# Patient Record
Sex: MALE | Race: WHITE | NOT HISPANIC OR LATINO | Employment: FULL TIME | URBAN - METROPOLITAN AREA
[De-identification: names, ages, dates, MRNs, and addresses within clinical notes are randomized per-mention and may not be internally consistent; named-entity substitution may affect disease eponyms.]

---

## 2023-10-18 PROBLEM — I10 HYPERTENSION: Status: ACTIVE | Noted: 2023-10-18

## 2023-10-18 PROBLEM — R20.0 LEG NUMBNESS: Status: ACTIVE | Noted: 2023-10-18

## 2023-10-18 PROBLEM — I51.7 LEFT VENTRICULAR HYPERTROPHY: Status: ACTIVE | Noted: 2023-10-18

## 2023-10-18 PROBLEM — J45.990 ASTHMA, EXERCISE INDUCED (HHS-HCC): Status: ACTIVE | Noted: 2023-10-18

## 2023-10-18 PROBLEM — G47.33 MODERATE OBSTRUCTIVE SLEEP APNEA: Status: ACTIVE | Noted: 2023-10-18

## 2023-10-18 PROBLEM — N40.0 BENIGN PROSTATIC HYPERPLASIA: Status: ACTIVE | Noted: 2023-10-18

## 2023-10-18 PROBLEM — I51.89 DIASTOLIC DYSFUNCTION: Status: ACTIVE | Noted: 2023-10-18

## 2023-10-18 PROBLEM — N52.9 ORGANIC IMPOTENCE: Status: ACTIVE | Noted: 2023-10-18

## 2023-10-18 PROBLEM — M1A.9XX0 CHRONIC GOUT: Status: ACTIVE | Noted: 2023-10-18

## 2023-10-18 PROBLEM — R45.4 IRRITABILITY: Status: ACTIVE | Noted: 2023-10-18

## 2023-10-18 PROBLEM — B35.3 TINEA PEDIS OF BOTH FEET: Status: ACTIVE | Noted: 2023-10-18

## 2023-10-18 PROBLEM — R06.02 SHORTNESS OF BREATH: Status: ACTIVE | Noted: 2023-10-18

## 2023-10-18 RX ORDER — TADALAFIL 20 MG/1
1 TABLET ORAL
COMMUNITY
Start: 2017-01-20 | End: 2023-10-19

## 2023-10-18 RX ORDER — MULTIVITAMIN
TABLET ORAL
COMMUNITY

## 2023-10-18 RX ORDER — VENLAFAXINE HYDROCHLORIDE 37.5 MG/1
CAPSULE, EXTENDED RELEASE ORAL
COMMUNITY
Start: 2017-03-17 | End: 2023-10-19

## 2023-10-18 RX ORDER — AMMONIUM LACTATE 12 G/100G
LOTION TOPICAL
COMMUNITY
Start: 2016-01-20

## 2023-10-18 RX ORDER — HYDROCHLOROTHIAZIDE 25 MG/1
1 TABLET ORAL DAILY
COMMUNITY
Start: 2017-06-26

## 2023-10-18 RX ORDER — LAMOTRIGINE 100 MG/1
2 TABLET ORAL EVERY MORNING
COMMUNITY
Start: 2014-06-30

## 2023-10-18 RX ORDER — ASCORBATE CALCIUM 500 MG
TABLET ORAL
COMMUNITY

## 2023-10-18 RX ORDER — LOSARTAN POTASSIUM 100 MG/1
1 TABLET ORAL DAILY
COMMUNITY
Start: 2018-09-14

## 2023-10-18 RX ORDER — LANOLIN ALCOHOL/MO/W.PET/CERES
CREAM (GRAM) TOPICAL
COMMUNITY

## 2023-10-18 RX ORDER — METHYLPHENIDATE HYDROCHLORIDE EXTENDED RELEASE 20 MG/1
1 TABLET ORAL DAILY
COMMUNITY
Start: 2016-11-15

## 2023-10-18 RX ORDER — ALLOPURINOL 300 MG/1
1 TABLET ORAL DAILY
COMMUNITY
Start: 2013-05-24

## 2023-10-18 RX ORDER — TURMERIC 400 MG
CAPSULE ORAL
COMMUNITY

## 2023-10-19 ENCOUNTER — OFFICE VISIT (OUTPATIENT)
Dept: CARDIOLOGY | Facility: CLINIC | Age: 63
End: 2023-10-19
Payer: COMMERCIAL

## 2023-10-19 VITALS
SYSTOLIC BLOOD PRESSURE: 132 MMHG | BODY MASS INDEX: 29.71 KG/M2 | HEIGHT: 76 IN | OXYGEN SATURATION: 94 % | WEIGHT: 244 LBS | DIASTOLIC BLOOD PRESSURE: 72 MMHG | HEART RATE: 71 BPM

## 2023-10-19 DIAGNOSIS — I51.89 DIASTOLIC DYSFUNCTION: ICD-10-CM

## 2023-10-19 DIAGNOSIS — I10 PRIMARY HYPERTENSION: ICD-10-CM

## 2023-10-19 DIAGNOSIS — E78.5 HYPERLIPIDEMIA, UNSPECIFIED HYPERLIPIDEMIA TYPE: Primary | ICD-10-CM

## 2023-10-19 PROCEDURE — 3078F DIAST BP <80 MM HG: CPT | Performed by: INTERNAL MEDICINE

## 2023-10-19 PROCEDURE — 99213 OFFICE O/P EST LOW 20 MIN: CPT | Performed by: INTERNAL MEDICINE

## 2023-10-19 PROCEDURE — 1036F TOBACCO NON-USER: CPT | Performed by: INTERNAL MEDICINE

## 2023-10-19 PROCEDURE — 3075F SYST BP GE 130 - 139MM HG: CPT | Performed by: INTERNAL MEDICINE

## 2023-10-19 RX ORDER — LISINOPRIL AND HYDROCHLOROTHIAZIDE 12.5; 2 MG/1; MG/1
1 TABLET ORAL
COMMUNITY

## 2023-10-19 RX ORDER — VENLAFAXINE HYDROCHLORIDE 75 MG/1
75 CAPSULE, EXTENDED RELEASE ORAL DAILY
COMMUNITY
Start: 2023-09-02

## 2023-10-19 RX ORDER — ATORVASTATIN CALCIUM 20 MG/1
20 TABLET, FILM COATED ORAL DAILY
Qty: 30 TABLET | Refills: 11 | Status: SHIPPED | OUTPATIENT
Start: 2023-10-19 | End: 2023-12-07 | Stop reason: SDUPTHER

## 2023-10-19 RX ORDER — TRAZODONE HYDROCHLORIDE 50 MG/1
50 TABLET ORAL NIGHTLY PRN
COMMUNITY
Start: 2023-09-07

## 2023-10-19 RX ORDER — CYANOCOBALAMIN (VITAMIN B-12) 500 MCG
TABLET ORAL
COMMUNITY

## 2023-10-19 ASSESSMENT — PAIN SCALES - GENERAL: PAINLEVEL: 0-NO PAIN

## 2023-10-19 ASSESSMENT — ENCOUNTER SYMPTOMS
DEPRESSION: 0
OCCASIONAL FEELINGS OF UNSTEADINESS: 0
LOSS OF SENSATION IN FEET: 0

## 2023-10-19 NOTE — PROGRESS NOTES
Chief Complaint:   No chief complaint on file.     History Of Present Illness:    Richard Teixeira is a 63 y.o. male with a history of hypertension, mild concentric left ventricular hypertrophy, diastolic dysfunction, and obstructive sleep apnea here for discussion about his lipid panel.      Comes today because his lipid panel was drawn at work and demonstrated an elevated numbers.    We talked about his lipid panels over the past several years.  We talked about the fact that his numbers never looked better than when he was training for his trip to [facility].     Treadmill stress test 8/25/2021: No evidence of ischemia or scar. Regan treadmill score of 5     Echocardiogram 11/22/16 demonstrating normal LV size with mild concentric LVH, EF 60-65%. Grade 1 diastolic dysfunction. Normal RV size and function.     Treadmill nuclear stress test 11/2/16. Patient exercised 9 minutes and 55 seconds on standard Stephan protocol achieving 85% of the age. Maximum heart rate. No ECG evidence of ischemia.     Past Medical History:  He has no past medical history on file.    Past Surgical History:  He has a past surgical history that includes Appendectomy (11/15/2016) and Back surgery (11/15/2016).      Social History:  He reports that he has never smoked. He has never used smokeless tobacco. He reports that he does not drink alcohol and does not use drugs.    Family History:  Family History   Problem Relation Name Age of Onset    Uterine cancer Mother      Other (Coronary Heart Disease) Father          Allergies:  Patient has no known allergies.    Outpatient Medications:  Current Outpatient Medications   Medication Instructions    allopurinol (Zyloprim) 300 mg tablet 1 tablet, oral, Daily    ammonium lactate (Lac-Hydrin) 12 % lotion Ammonium Lactate 12 % External Lotion   Quantity: 400  Refills: 0        Start : 20-Jan-2016   Active    CALCIUM CITRATE-VITAMIN D3 ORAL Calcium + D TABS   Refills: 0       Active    cholecalciferol  "(Vitamin D-3) 10 MCG (400 UNIT) tablet     hydroCHLOROthiazide (HYDRODiuril) 25 mg tablet 1 tablet, oral, Daily    lamoTRIgine (LaMICtal) 100 mg tablet 2 tablets, oral, Every morning,  and 1 tablet by mouth at bedtime<BR>    lisinopriL-hydrochlorothiazide 20-12.5 mg tablet 1 tablet, oral, Daily RT    losartan (Cozaar) 100 mg tablet 1 tablet, oral, Daily    magnesium oxide (Mag-Ox) 400 mg (241.3 mg magnesium) tablet Magnesium Oxide 400 MG Oral Tablet   Refills: 0       Active    methylphenidate ER (Metadate ER) 20 mg daily tablet 1 tablet, oral, Daily, As directed     multivitamin (Daily Multi-Vitamin) tablet Multi Vitamin Daily TABS   Refills: 0       Active    traZODone (DESYREL) 50 mg, oral, Nightly PRN    turmeric 400 mg capsule Turmeric 400 MG Oral Capsule   Refills: 0       Active    venlafaxine XR (EFFEXOR-XR) 75 mg, oral, Daily    vitamin E acid succinate (vitamin E succinate) 67 mg (100 unit) tablet Vitamin E 100 UNIT Oral Tablet   Refills: 0       Active       Last Recorded Vitals:  Visit Vitals  /72 (BP Location: Left arm, Patient Position: Sitting)   Pulse 71   Ht 1.93 m (6' 4\")   Wt 111 kg (244 lb)   SpO2 94%   BMI 29.70 kg/m²   Smoking Status Never   BSA 2.44 m²        Physical Exam:  Constitutional: alert and in no acute distress.   Psychiatric: Oriented to person, place and time. No obvious agitation.      Last Labs:  CBC -  No results found for: \"WBC\", \"HGB\", \"HCT\", \"MCV\", \"PLT\"    CMP -  No results found for: \"CALCIUM\", \"PHOS\", \"PROT\", \"ALBUMIN\", \"AST\", \"ALT\", \"ALKPHOS\", \"BILITOT\"    LIPID PANEL -   Lipid panel 2023: Total 213, HDL 52, , TG 85  Lipid Panel 8/16/21: Total 168, HDL 57, , TG 82  Lipid panel 2020: Total 216, HDL 52, ,     RENAL FUNCTION PANEL -   No results found for: \"GLUCOSE\", \"NA\", \"K\", \"CL\", \"CO2\", \"ANIONGAP\", \"BUN\", \"CREATININE\", \"GFRMALE\", \"CALCIUM\", \"PHOS\", \"ALBUMIN\"     Lab Results   Component Value Date    HGBA1C 5.8 (H) 08/16/2021 "           Assessment/Plan   1) dyslipidemia: Went over his lipid panels over the past several years.  Other than 2021 when he was training to go to [employer] his lipid panel has essentially not changed for several years.  We went over his 10-year atherosclerotic cardiovascular disease risk calculator which yielded a 10-year risk greater than 10%.  We discussed the recommendation to start statin therapy.  We have decided to start atorvastatin 20 mg daily.  He is encouraged to continue diet and exercise.  He will get his routine blood work next year prior to his appointment in August.    2) follow-up: In August or sooner if needed      Lewis Andrade MD

## 2023-12-07 DIAGNOSIS — E78.5 HYPERLIPIDEMIA, UNSPECIFIED HYPERLIPIDEMIA TYPE: ICD-10-CM

## 2023-12-08 RX ORDER — ATORVASTATIN CALCIUM 20 MG/1
20 TABLET, FILM COATED ORAL DAILY
Qty: 30 TABLET | Refills: 11 | Status: SHIPPED | OUTPATIENT
Start: 2023-12-08 | End: 2023-12-11 | Stop reason: SDUPTHER

## 2023-12-08 RX ORDER — AMMONIUM LACTATE 12 G/100G
LOTION TOPICAL
OUTPATIENT
Start: 2023-12-08

## 2023-12-11 DIAGNOSIS — E78.5 HYPERLIPIDEMIA, UNSPECIFIED HYPERLIPIDEMIA TYPE: ICD-10-CM

## 2023-12-11 RX ORDER — ATORVASTATIN CALCIUM 20 MG/1
20 TABLET, FILM COATED ORAL DAILY
Qty: 90 TABLET | Refills: 3 | Status: SHIPPED | OUTPATIENT
Start: 2023-12-11 | End: 2024-12-10

## 2024-06-19 DIAGNOSIS — I11.0 BENIGN HYPERTENSIVE HEART DISEASE WITH HEART FAILURE (MULTI): ICD-10-CM

## 2024-06-19 RX ORDER — LOSARTAN POTASSIUM 100 MG/1
100 TABLET ORAL DAILY
Qty: 90 TABLET | Refills: 3 | Status: SHIPPED | OUTPATIENT
Start: 2024-06-19

## 2024-08-05 DIAGNOSIS — I10 ESSENTIAL HYPERTENSION: ICD-10-CM

## 2024-08-05 RX ORDER — HYDROCHLOROTHIAZIDE 25 MG/1
25 TABLET ORAL DAILY
Qty: 90 TABLET | Refills: 3 | Status: SHIPPED | OUTPATIENT
Start: 2024-08-05

## 2024-08-15 ENCOUNTER — APPOINTMENT (OUTPATIENT)
Dept: CARDIOLOGY | Facility: CLINIC | Age: 64
End: 2024-08-15
Payer: COMMERCIAL

## 2024-09-05 ENCOUNTER — APPOINTMENT (OUTPATIENT)
Dept: CARDIOLOGY | Facility: CLINIC | Age: 64
End: 2024-09-05
Payer: COMMERCIAL

## 2024-09-05 VITALS
OXYGEN SATURATION: 98 % | HEIGHT: 76 IN | SYSTOLIC BLOOD PRESSURE: 132 MMHG | HEART RATE: 66 BPM | WEIGHT: 242 LBS | BODY MASS INDEX: 29.47 KG/M2 | DIASTOLIC BLOOD PRESSURE: 80 MMHG

## 2024-09-05 DIAGNOSIS — E78.5 DYSLIPIDEMIA: ICD-10-CM

## 2024-09-05 DIAGNOSIS — I10 PRIMARY HYPERTENSION: ICD-10-CM

## 2024-09-05 DIAGNOSIS — I51.7 LEFT VENTRICULAR HYPERTROPHY: Primary | ICD-10-CM

## 2024-09-05 PROCEDURE — 3078F DIAST BP <80 MM HG: CPT | Performed by: NURSE PRACTITIONER

## 2024-09-05 PROCEDURE — 3008F BODY MASS INDEX DOCD: CPT | Performed by: NURSE PRACTITIONER

## 2024-09-05 PROCEDURE — 3075F SYST BP GE 130 - 139MM HG: CPT | Performed by: NURSE PRACTITIONER

## 2024-09-05 PROCEDURE — 99214 OFFICE O/P EST MOD 30 MIN: CPT | Performed by: NURSE PRACTITIONER

## 2024-09-05 PROCEDURE — 93000 ELECTROCARDIOGRAM COMPLETE: CPT | Performed by: NURSE PRACTITIONER

## 2024-09-05 NOTE — PROGRESS NOTES
Name : Polo Teixeira   : 1960   MRN : [id number]   ENC Date : 2024    Primary Cardiologist: Dr. Andrade     CC: No chief complaint on file.     HPI:    Polo Teixeira is a 64 y.o. male hypertension, mild concentric left ventricular hypertrophy, diastolic dysfunction, and obstructive sleep apnea who presents today for annual cardiovascular follow up.     He has done very well since his last visit. Reports no major health issues and has had no hospitalizations. Denies any chest pain, pressure, SOB/, PND, orthopnea, LE edema, palpitations, lightheadedness, dizziness, or syncope at rest or with exertions. He is compliant with his medications and reports no side effects. He has been physically active:    lifts almost everyday, multiple exercise classes (strength/cardio/yoga/cycling).    Now on a Mediterranean diet     for medical mutual.    CV Diagnostics:  Treadmill stress test 2021: No evidence of ischemia or scar. Regan treadmill score of 5     Echocardiogram 16 demonstrating normal LV size with mild concentric LVH, EF 60-65%. Grade 1 diastolic dysfunction. Normal RV size and function.     Treadmill nuclear stress test 16. Patient exercised 9 minutes and 55 seconds on standard Stephan protocol achieving 85% of the age. Maximum heart rate. No ECG evidence of ischemia.    ROS: unless otherwise noted in the history of present illness, all other systems were reviewed and they are negative for complaints     Allergies:  Patient has no known allergies.    Current Outpatient Medications   Medication Instructions    allopurinol (Zyloprim) 300 mg tablet 1 tablet, oral, Daily    ammonium lactate (Lac-Hydrin) 12 % lotion Ammonium Lactate 12 % External Lotion   Quantity: 400  Refills: 0        Start : 2016   Active    atorvastatin (LIPITOR) 20 mg, oral, Daily    CALCIUM CITRATE-VITAMIN D3 ORAL Calcium + D TABS   Refills: 0       Active    hydroCHLOROthiazide (HYDRODIURIL) 25 mg,  "oral, Daily    lamoTRIgine (LaMICtal) 100 mg tablet 2 tablets, oral, Every morning,  and 1 tablet by mouth at bedtime<BR>    losartan (COZAAR) 100 mg, oral, Daily    magnesium oxide (Mag-Ox) 400 mg (241.3 mg magnesium) tablet Magnesium Oxide 400 MG Oral Tablet   Refills: 0       Active    methylphenidate ER (Metadate ER) 20 mg daily tablet 1 tablet, oral, Daily, As directed     multivitamin (Daily Multi-Vitamin) tablet Multi Vitamin Daily TABS   Refills: 0       Active    traZODone (DESYREL) 50 mg, oral, Nightly PRN, Taking 25mg at bedtime nightly    venlafaxine XR (EFFEXOR-XR) 75 mg, oral, Daily    vitamin E acid succinate (vitamin E succinate) 67 mg (100 unit) tablet Vitamin E 100 UNIT Oral Tablet   Refills: 0       Active        Last Labs:  CBC  No results found for: \"WBC\", \"HGB\", \"HCT\", \"MCV\", \"PLT\"    CMP  No results found for: \"CALCIUM\", \"PHOS\", \"PROT\", \"ALBUMIN\", \"AST\", \"ALT\", \"ALKPHOS\", \"BILITOT\"    BMP   No results found for: \"NA\", \"K\", \"CL\", \"CO2\", \"GLUCOSE\", \"BUN\", \"CREATININE\"    LIPID PANEL   No results found for: \"CHOL\", \"TRIG\", \"HDL\", \"CHHDL\", \"LDLF\", \"VLDL\", \"NHDL\"    RENAL FUNCTION PANEL   No results found for: \"GLUCOSE\", \"NA\", \"K\", \"CL\", \"CO2\", \"ANIONGAP\", \"BUN\", \"CREATININE\", \"GFRMALE\", \"CALCIUM\", \"PHOS\", \"ALBUMIN\"     Lab Results   Component Value Date    HGBA1C 5.8 (H) 08/16/2021     I have reviewed the above labs & diagnostics    Last Recorded Vitals:  Vitals:    09/05/24 1130 09/05/24 1140   BP: 140/70 132/80   BP Location: Right arm Right arm   Patient Position: Sitting    Pulse: 66    SpO2: 98%    Weight: 110 kg (242 lb)    Height: 1.93 m (6' 4\")      Physical Exam:  On exam Mr. Polo Teixeira appears his stated age, is alert and oriented x3, and in no acute distress. His sclera are anicteric and his oropharynx has moist mucous membranes. His neck is supple and without thyromegaly. The JVP is ~5 cm of water above the right atrium. His cardiac exam has regular rhythm, normal S1, S2. No " S3/4. There are no murmurs. His lungs are clear to auscultation bilaterally and there is no dullness to percussion. His abdomen is soft, nontender with normoactive bowel sounds. There is no HJR. The extremities are warm and without edema. The skin is dry. There is no rash present. The distal pulses are 2-3+ in all four extremities. His mood and affect are appropriate for todays encounter.     Assessment/Plan:  Dyslipidemia. Well controlled on statin therapy     Left Ventricular Hypertrophy. Mild by echo in 2016. EKG without LVH. Will continue to monitor clinically. Consider repeat echocardiogram next year    Hypertension. /80 mmHg today.  Was 106/64 mmHg on his wellness exam 8/30/24. Reports that he did have a lot of hotdogs over the holiday weekend. Will continue with current regimen & monitor closely.    Tracy M Schwab, APRN-CNP

## 2024-09-06 PROBLEM — E78.5 DYSLIPIDEMIA: Status: ACTIVE | Noted: 2024-09-06

## 2024-12-05 DIAGNOSIS — N52.9 ERECTILE DYSFUNCTION, UNSPECIFIED ERECTILE DYSFUNCTION TYPE: Primary | ICD-10-CM

## 2024-12-05 DIAGNOSIS — E78.5 HYPERLIPIDEMIA, UNSPECIFIED HYPERLIPIDEMIA TYPE: ICD-10-CM

## 2024-12-05 RX ORDER — TADALAFIL 20 MG/1
20 TABLET ORAL
Qty: 10 TABLET | Refills: 3 | Status: SHIPPED | OUTPATIENT
Start: 2024-12-05 | End: 2025-04-04

## 2024-12-05 RX ORDER — ATORVASTATIN CALCIUM 20 MG/1
20 TABLET, FILM COATED ORAL DAILY
Qty: 90 TABLET | Refills: 3 | Status: SHIPPED | OUTPATIENT
Start: 2024-12-05

## 2025-05-23 ENCOUNTER — TELEPHONE (OUTPATIENT)
Dept: CARDIOLOGY | Facility: CLINIC | Age: 65
End: 2025-05-23
Payer: COMMERCIAL

## 2025-05-23 DIAGNOSIS — N52.9 ERECTILE DYSFUNCTION, UNSPECIFIED ERECTILE DYSFUNCTION TYPE: ICD-10-CM

## 2025-05-27 RX ORDER — TADALAFIL 20 MG/1
20 TABLET ORAL
Qty: 10 TABLET | Refills: 3 | Status: SHIPPED | OUTPATIENT
Start: 2025-05-27 | End: 2025-09-24

## 2025-06-16 DIAGNOSIS — I11.0 BENIGN HYPERTENSIVE HEART DISEASE WITH HEART FAILURE: ICD-10-CM

## 2025-06-16 RX ORDER — LOSARTAN POTASSIUM 100 MG/1
100 TABLET ORAL DAILY
Qty: 90 TABLET | Refills: 3 | Status: SHIPPED | OUTPATIENT
Start: 2025-06-16

## 2025-07-30 DIAGNOSIS — I10 ESSENTIAL HYPERTENSION: ICD-10-CM

## 2025-07-30 RX ORDER — HYDROCHLOROTHIAZIDE 25 MG/1
25 TABLET ORAL DAILY
Qty: 90 TABLET | Refills: 3 | Status: SHIPPED | OUTPATIENT
Start: 2025-07-30

## 2025-09-09 ENCOUNTER — APPOINTMENT (OUTPATIENT)
Dept: CARDIOLOGY | Facility: CLINIC | Age: 65
End: 2025-09-09
Payer: COMMERCIAL